# Patient Record
(demographics unavailable — no encounter records)

---

## 2025-07-01 NOTE — HISTORY OF PRESENT ILLNESS
[Telehealth (audio & video)] : This visit was provided via telehealth using real-time 2-way audio visual technology. [FreeTextEntry8] : CATARINO LOYA is a 40 year old female presenting for Right upper quadrant discomfort intermittently occurring for the last few.  No pain/fever or chills

## 2025-07-01 NOTE — PLAN
[FreeTextEntry1] : Discussed symptoms Low up with PCP as scheduled appointment at the end of this month Will follow-up the results of the ultrasound Go to ER if symptoms worsen

## 2025-07-30 NOTE — HISTORY OF PRESENT ILLNESS
[FreeTextEntry1] : Establish care, CPE [de-identified] : 41 yo female presents to establish care, CPE. Last CPE was with Dr. Crowder in March 2024. Patient saw neuro Dr. Cisneros for history of possible TIA in Aug 2024. Bloodwork including coags were WNL, brain MR WNL, patient cleared for IVF. Patient follows with gyn Dr. Batista, last pap smear was Aug 2024, neg HPV. Last mammogram/breast US was Sept 2024, WNL. Follows with fertility specialist, has done few IUI cycles, planning on starting IVF. Patient states she has had 2 colonoscopies in past, dealing with digestive issues, likely component of IBS.  Overall feeling well today.

## 2025-07-30 NOTE — HEALTH RISK ASSESSMENT
[No] : In the past 12 months have you used drugs other than those required for medical reasons? No [0] : 2) Feeling down, depressed, or hopeless: Not at all (0) [PHQ-2 Negative - No further assessment needed] : PHQ-2 Negative - No further assessment needed [None] : None [With Significant Other] : lives with significant other [Employed] : employed [] :  [Fully functional (bathing, dressing, toileting, transferring, walking, feeding)] : Fully functional (bathing, dressing, toileting, transferring, walking, feeding) [Fully functional (using the telephone, shopping, preparing meals, housekeeping, doing laundry, using] : Fully functional and needs no help or supervision to perform IADLs (using the telephone, shopping, preparing meals, housekeeping, doing laundry, using transportation, managing medications and managing finances) [Never] : Never [Audit-CScore] : 0 [de-identified] : 3x times weekly biking [de-identified] : balanced [[id number]] : 0 [Patient reported mammogram was normal] : Patient reported mammogram was normal [Patient reported PAP Smear was normal] : Patient reported PAP Smear was normal [HIV test declined] : HIV test declined [Hepatitis C test declined] : Hepatitis C test declined [Change in mental status noted] : No change in mental status noted [MammogramDate] : 09/24 [PapSmearDate] : 08/24 [FreeTextEntry2] : CONNOR LEMON RN

## 2025-07-30 NOTE — PLAN
[FreeTextEntry1] : HCM -Patient states she gets bloodwork every few weeks via fertility specialist - declines screening bloodwork today -Up to date on mammogram, pap smear, f/u gyn as scheduled -Declines STI screening